# Patient Record
Sex: FEMALE | Race: ASIAN | Employment: UNEMPLOYED | URBAN - METROPOLITAN AREA
[De-identification: names, ages, dates, MRNs, and addresses within clinical notes are randomized per-mention and may not be internally consistent; named-entity substitution may affect disease eponyms.]

---

## 2016-07-26 LAB
ABO + RH BLD: NORMAL
ABO + RH BLD: NORMAL
BLD GP AB SCN SERPL QL: NEGATIVE
HBV SURFACE AG SERPL QL IA: NORMAL
HIV 1+2 AB+HIV1 P24 AG SERPL QL IA: NORMAL
RUBELLA ABY IGG: NORMAL
RUBELLA ANTIBODY IGG QUANTITATIVE: 38 IU/ML
T PALLIDUM IGG SER QL: NEGATIVE

## 2017-01-24 LAB — GROUP B STREP PCR: POSITIVE

## 2017-02-18 ENCOUNTER — ANESTHESIA (OUTPATIENT)
Dept: OBGYN | Facility: CLINIC | Age: 32
End: 2017-02-18
Payer: COMMERCIAL

## 2017-02-18 ENCOUNTER — ANESTHESIA EVENT (OUTPATIENT)
Dept: OBGYN | Facility: CLINIC | Age: 32
End: 2017-02-18
Payer: COMMERCIAL

## 2017-02-18 ENCOUNTER — HOSPITAL ENCOUNTER (INPATIENT)
Facility: CLINIC | Age: 32
LOS: 2 days | Discharge: HOME | End: 2017-02-20
Attending: OBSTETRICS & GYNECOLOGY | Admitting: OBSTETRICS & GYNECOLOGY
Payer: COMMERCIAL

## 2017-02-18 PROBLEM — O26.90 PREGNANCY RELATED CONDITION: Status: ACTIVE | Noted: 2017-02-18

## 2017-02-18 LAB
ABO + RH BLD: NORMAL
ABO + RH BLD: NORMAL
SPECIMEN EXP DATE BLD: NORMAL
T PALLIDUM IGG+IGM SER QL: NEGATIVE

## 2017-02-18 PROCEDURE — 99215 OFFICE O/P EST HI 40 MIN: CPT | Mod: 25

## 2017-02-18 PROCEDURE — 25000125 ZZHC RX 250: Performed by: OBSTETRICS & GYNECOLOGY

## 2017-02-18 PROCEDURE — 25000128 H RX IP 250 OP 636: Performed by: ANESTHESIOLOGY

## 2017-02-18 PROCEDURE — 72200001 ZZH LABOR CARE VAGINAL DELIVERY SINGLE

## 2017-02-18 PROCEDURE — 12000037 ZZH R&B POSTPARTUM INTERMEDIATE

## 2017-02-18 PROCEDURE — 25000132 ZZH RX MED GY IP 250 OP 250 PS 637: Performed by: OBSTETRICS & GYNECOLOGY

## 2017-02-18 PROCEDURE — 59025 FETAL NON-STRESS TEST: CPT

## 2017-02-18 PROCEDURE — 86780 TREPONEMA PALLIDUM: CPT | Performed by: OBSTETRICS & GYNECOLOGY

## 2017-02-18 PROCEDURE — 25000128 H RX IP 250 OP 636: Performed by: OBSTETRICS & GYNECOLOGY

## 2017-02-18 PROCEDURE — 37000011 ZZH ANESTHESIA WARD SERVICE

## 2017-02-18 PROCEDURE — 0HQ9XZZ REPAIR PERINEUM SKIN, EXTERNAL APPROACH: ICD-10-PCS | Performed by: OBSTETRICS & GYNECOLOGY

## 2017-02-18 PROCEDURE — 86900 BLOOD TYPING SEROLOGIC ABO: CPT | Performed by: OBSTETRICS & GYNECOLOGY

## 2017-02-18 PROCEDURE — 3E0S3CZ INTRODUCTION OF REGIONAL ANESTHETIC INTO EPIDURAL SPACE, PERCUTANEOUS APPROACH: ICD-10-PCS | Performed by: ANESTHESIOLOGY

## 2017-02-18 PROCEDURE — 00HU33Z INSERTION OF INFUSION DEVICE INTO SPINAL CANAL, PERCUTANEOUS APPROACH: ICD-10-PCS | Performed by: ANESTHESIOLOGY

## 2017-02-18 PROCEDURE — 25000125 ZZHC RX 250: Performed by: ANESTHESIOLOGY

## 2017-02-18 PROCEDURE — 10907ZC DRAINAGE OF AMNIOTIC FLUID, THERAPEUTIC FROM PRODUCTS OF CONCEPTION, VIA NATURAL OR ARTIFICIAL OPENING: ICD-10-PCS | Performed by: OBSTETRICS & GYNECOLOGY

## 2017-02-18 PROCEDURE — 25800025 ZZH RX 258: Performed by: OBSTETRICS & GYNECOLOGY

## 2017-02-18 PROCEDURE — 86901 BLOOD TYPING SEROLOGIC RH(D): CPT | Performed by: OBSTETRICS & GYNECOLOGY

## 2017-02-18 PROCEDURE — 36415 COLL VENOUS BLD VENIPUNCTURE: CPT | Performed by: OBSTETRICS & GYNECOLOGY

## 2017-02-18 PROCEDURE — 25000125 ZZHC RX 250

## 2017-02-18 RX ORDER — OXYTOCIN/0.9 % SODIUM CHLORIDE 30/500 ML
340 PLASTIC BAG, INJECTION (ML) INTRAVENOUS CONTINUOUS PRN
Status: DISCONTINUED | OUTPATIENT
Start: 2017-02-18 | End: 2017-02-20 | Stop reason: HOSPADM

## 2017-02-18 RX ORDER — BISACODYL 10 MG
10 SUPPOSITORY, RECTAL RECTAL DAILY PRN
Status: DISCONTINUED | OUTPATIENT
Start: 2017-02-20 | End: 2017-02-20 | Stop reason: HOSPADM

## 2017-02-18 RX ORDER — OXYCODONE HYDROCHLORIDE 5 MG/1
5-10 TABLET ORAL
Status: DISCONTINUED | OUTPATIENT
Start: 2017-02-18 | End: 2017-02-20 | Stop reason: HOSPADM

## 2017-02-18 RX ORDER — OXYCODONE AND ACETAMINOPHEN 5; 325 MG/1; MG/1
1 TABLET ORAL
Status: DISCONTINUED | OUTPATIENT
Start: 2017-02-18 | End: 2017-02-18

## 2017-02-18 RX ORDER — LIDOCAINE 40 MG/G
CREAM TOPICAL
Status: DISCONTINUED | OUTPATIENT
Start: 2017-02-18 | End: 2017-02-18

## 2017-02-18 RX ORDER — ROPIVACAINE HYDROCHLORIDE 2 MG/ML
10 INJECTION, SOLUTION EPIDURAL; INFILTRATION; PERINEURAL ONCE
Status: COMPLETED | OUTPATIENT
Start: 2017-02-18 | End: 2017-02-18

## 2017-02-18 RX ORDER — PRENATAL VIT/IRON FUM/FOLIC AC 27MG-0.8MG
1 TABLET ORAL DAILY
Status: DISCONTINUED | OUTPATIENT
Start: 2017-02-18 | End: 2017-02-20 | Stop reason: HOSPADM

## 2017-02-18 RX ORDER — FENTANYL CITRATE 50 UG/ML
50-100 INJECTION, SOLUTION INTRAMUSCULAR; INTRAVENOUS
Status: DISCONTINUED | OUTPATIENT
Start: 2017-02-18 | End: 2017-02-18

## 2017-02-18 RX ORDER — OXYTOCIN/0.9 % SODIUM CHLORIDE 30/500 ML
100-340 PLASTIC BAG, INJECTION (ML) INTRAVENOUS CONTINUOUS PRN
Status: COMPLETED | OUTPATIENT
Start: 2017-02-18 | End: 2017-02-18

## 2017-02-18 RX ORDER — OXYTOCIN/0.9 % SODIUM CHLORIDE 30/500 ML
1-24 PLASTIC BAG, INJECTION (ML) INTRAVENOUS CONTINUOUS
Status: DISCONTINUED | OUTPATIENT
Start: 2017-02-18 | End: 2017-02-18

## 2017-02-18 RX ORDER — ONDANSETRON 2 MG/ML
4 INJECTION INTRAMUSCULAR; INTRAVENOUS EVERY 6 HOURS PRN
Status: DISCONTINUED | OUTPATIENT
Start: 2017-02-18 | End: 2017-02-18

## 2017-02-18 RX ORDER — MISOPROSTOL 200 UG/1
400 TABLET ORAL
Status: DISCONTINUED | OUTPATIENT
Start: 2017-02-18 | End: 2017-02-20 | Stop reason: HOSPADM

## 2017-02-18 RX ORDER — IBUPROFEN 400 MG/1
400-800 TABLET, FILM COATED ORAL EVERY 6 HOURS PRN
Status: DISCONTINUED | OUTPATIENT
Start: 2017-02-18 | End: 2017-02-20 | Stop reason: HOSPADM

## 2017-02-18 RX ORDER — SODIUM CHLORIDE, SODIUM LACTATE, POTASSIUM CHLORIDE, CALCIUM CHLORIDE 600; 310; 30; 20 MG/100ML; MG/100ML; MG/100ML; MG/100ML
INJECTION, SOLUTION INTRAVENOUS CONTINUOUS
Status: DISCONTINUED | OUTPATIENT
Start: 2017-02-18 | End: 2017-02-18

## 2017-02-18 RX ORDER — AMOXICILLIN 250 MG
1-2 CAPSULE ORAL 2 TIMES DAILY
Status: DISCONTINUED | OUTPATIENT
Start: 2017-02-18 | End: 2017-02-20 | Stop reason: HOSPADM

## 2017-02-18 RX ORDER — FENTANYL CITRATE 50 UG/ML
100 INJECTION, SOLUTION INTRAMUSCULAR; INTRAVENOUS ONCE
Status: COMPLETED | OUTPATIENT
Start: 2017-02-18 | End: 2017-02-18

## 2017-02-18 RX ORDER — ROPIVACAINE HYDROCHLORIDE 2 MG/ML
INJECTION, SOLUTION EPIDURAL; INFILTRATION; PERINEURAL
Status: DISCONTINUED
Start: 2017-02-18 | End: 2017-02-18 | Stop reason: HOSPADM

## 2017-02-18 RX ORDER — LANOLIN 100 %
OINTMENT (GRAM) TOPICAL
Status: DISCONTINUED | OUTPATIENT
Start: 2017-02-18 | End: 2017-02-20 | Stop reason: HOSPADM

## 2017-02-18 RX ORDER — FERROUS SULFATE 325(65) MG
TABLET ORAL
COMMUNITY

## 2017-02-18 RX ORDER — OXYTOCIN/0.9 % SODIUM CHLORIDE 30/500 ML
PLASTIC BAG, INJECTION (ML) INTRAVENOUS
Status: COMPLETED
Start: 2017-02-18 | End: 2017-02-18

## 2017-02-18 RX ORDER — CARBOPROST TROMETHAMINE 250 UG/ML
250 INJECTION, SOLUTION INTRAMUSCULAR
Status: DISCONTINUED | OUTPATIENT
Start: 2017-02-18 | End: 2017-02-18

## 2017-02-18 RX ORDER — HYDROMORPHONE HYDROCHLORIDE 1 MG/ML
.3-.5 INJECTION, SOLUTION INTRAMUSCULAR; INTRAVENOUS; SUBCUTANEOUS EVERY 30 MIN PRN
Status: DISCONTINUED | OUTPATIENT
Start: 2017-02-18 | End: 2017-02-20 | Stop reason: HOSPADM

## 2017-02-18 RX ORDER — PENICILLIN G POTASSIUM 5000000 [IU]/1
5 INJECTION, POWDER, FOR SOLUTION INTRAMUSCULAR; INTRAVENOUS ONCE
Status: COMPLETED | OUTPATIENT
Start: 2017-02-18 | End: 2017-02-18

## 2017-02-18 RX ORDER — ACETAMINOPHEN 325 MG/1
650 TABLET ORAL EVERY 4 HOURS PRN
Status: DISCONTINUED | OUTPATIENT
Start: 2017-02-18 | End: 2017-02-18

## 2017-02-18 RX ORDER — NALOXONE HYDROCHLORIDE 0.4 MG/ML
.1-.4 INJECTION, SOLUTION INTRAMUSCULAR; INTRAVENOUS; SUBCUTANEOUS
Status: DISCONTINUED | OUTPATIENT
Start: 2017-02-18 | End: 2017-02-18

## 2017-02-18 RX ORDER — EPHEDRINE SULFATE 50 MG/ML
5 INJECTION, SOLUTION INTRAMUSCULAR; INTRAVENOUS; SUBCUTANEOUS
Status: DISCONTINUED | OUTPATIENT
Start: 2017-02-18 | End: 2017-02-18

## 2017-02-18 RX ORDER — IBUPROFEN 800 MG/1
800 TABLET, FILM COATED ORAL
Status: DISCONTINUED | OUTPATIENT
Start: 2017-02-18 | End: 2017-02-18

## 2017-02-18 RX ORDER — ACETAMINOPHEN 325 MG/1
650 TABLET ORAL EVERY 4 HOURS PRN
Status: DISCONTINUED | OUTPATIENT
Start: 2017-02-18 | End: 2017-02-20 | Stop reason: HOSPADM

## 2017-02-18 RX ORDER — OXYTOCIN 10 [USP'U]/ML
10 INJECTION, SOLUTION INTRAMUSCULAR; INTRAVENOUS
Status: DISCONTINUED | OUTPATIENT
Start: 2017-02-18 | End: 2017-02-18

## 2017-02-18 RX ORDER — NALOXONE HYDROCHLORIDE 0.4 MG/ML
.1-.4 INJECTION, SOLUTION INTRAMUSCULAR; INTRAVENOUS; SUBCUTANEOUS
Status: DISCONTINUED | OUTPATIENT
Start: 2017-02-18 | End: 2017-02-20 | Stop reason: HOSPADM

## 2017-02-18 RX ORDER — HYDROCORTISONE 2.5 %
CREAM (GRAM) TOPICAL 3 TIMES DAILY PRN
Status: DISCONTINUED | OUTPATIENT
Start: 2017-02-18 | End: 2017-02-20 | Stop reason: HOSPADM

## 2017-02-18 RX ORDER — METHYLERGONOVINE MALEATE 0.2 MG/ML
200 INJECTION INTRAVENOUS
Status: DISCONTINUED | OUTPATIENT
Start: 2017-02-18 | End: 2017-02-18

## 2017-02-18 RX ORDER — OXYTOCIN/0.9 % SODIUM CHLORIDE 30/500 ML
100 PLASTIC BAG, INJECTION (ML) INTRAVENOUS CONTINUOUS
Status: DISCONTINUED | OUTPATIENT
Start: 2017-02-18 | End: 2017-02-20 | Stop reason: HOSPADM

## 2017-02-18 RX ORDER — NALBUPHINE HYDROCHLORIDE 10 MG/ML
2.5-5 INJECTION, SOLUTION INTRAMUSCULAR; INTRAVENOUS; SUBCUTANEOUS EVERY 6 HOURS PRN
Status: DISCONTINUED | OUTPATIENT
Start: 2017-02-18 | End: 2017-02-18

## 2017-02-18 RX ORDER — OXYTOCIN/0.9 % SODIUM CHLORIDE 30/500 ML
PLASTIC BAG, INJECTION (ML) INTRAVENOUS
Status: DISCONTINUED
Start: 2017-02-18 | End: 2017-02-18 | Stop reason: HOSPADM

## 2017-02-18 RX ORDER — PENICILLIN G POTASSIUM 5000000 [IU]/1
INJECTION, POWDER, FOR SOLUTION INTRAMUSCULAR; INTRAVENOUS
Status: DISCONTINUED
Start: 2017-02-18 | End: 2017-02-18 | Stop reason: HOSPADM

## 2017-02-18 RX ORDER — OXYTOCIN 10 [USP'U]/ML
10 INJECTION, SOLUTION INTRAMUSCULAR; INTRAVENOUS
Status: DISCONTINUED | OUTPATIENT
Start: 2017-02-18 | End: 2017-02-20 | Stop reason: HOSPADM

## 2017-02-18 RX ORDER — EPHEDRINE SULFATE 50 MG/ML
INJECTION, SOLUTION INTRAMUSCULAR; INTRAVENOUS; SUBCUTANEOUS
Status: DISCONTINUED
Start: 2017-02-18 | End: 2017-02-18 | Stop reason: WASHOUT

## 2017-02-18 RX ADMIN — Medication 12 ML/HR: at 14:30

## 2017-02-18 RX ADMIN — Medication 2 MILLI-UNITS/MIN: at 13:07

## 2017-02-18 RX ADMIN — PENICILLIN G POTASSIUM 5 MILLION UNITS: 5000000 POWDER, FOR SOLUTION INTRAMUSCULAR; INTRAPLEURAL; INTRATHECAL; INTRAVENOUS at 06:02

## 2017-02-18 RX ADMIN — IBUPROFEN 800 MG: 400 TABLET ORAL at 23:49

## 2017-02-18 RX ADMIN — Medication 340 ML/HR: at 16:14

## 2017-02-18 RX ADMIN — SENNOSIDES AND DOCUSATE SODIUM 1 TABLET: 8.6; 5 TABLET ORAL at 21:56

## 2017-02-18 RX ADMIN — FENTANYL CITRATE 100 MCG: 50 INJECTION, SOLUTION INTRAMUSCULAR; INTRAVENOUS at 14:34

## 2017-02-18 RX ADMIN — ACETAMINOPHEN 650 MG: 325 TABLET, FILM COATED ORAL at 17:37

## 2017-02-18 RX ADMIN — IBUPROFEN 800 MG: 400 TABLET ORAL at 17:37

## 2017-02-18 RX ADMIN — SODIUM CHLORIDE, POTASSIUM CHLORIDE, SODIUM LACTATE AND CALCIUM CHLORIDE: 600; 310; 30; 20 INJECTION, SOLUTION INTRAVENOUS at 13:07

## 2017-02-18 RX ADMIN — ROPIVACAINE HYDROCHLORIDE 10 ML: 2 INJECTION, SOLUTION EPIDURAL; INFILTRATION at 14:34

## 2017-02-18 RX ADMIN — ACETAMINOPHEN 650 MG: 325 TABLET, FILM COATED ORAL at 23:49

## 2017-02-18 RX ADMIN — SODIUM CHLORIDE, POTASSIUM CHLORIDE, SODIUM LACTATE AND CALCIUM CHLORIDE 1000 ML: 600; 310; 30; 20 INJECTION, SOLUTION INTRAVENOUS at 14:07

## 2017-02-18 RX ADMIN — SODIUM CHLORIDE 2.5 MILLION UNITS: 9 INJECTION, SOLUTION INTRAVENOUS at 14:30

## 2017-02-18 RX ADMIN — SODIUM CHLORIDE 2.5 MILLION UNITS: 9 INJECTION, SOLUTION INTRAVENOUS at 10:02

## 2017-02-18 RX ADMIN — SODIUM CHLORIDE, POTASSIUM CHLORIDE, SODIUM LACTATE AND CALCIUM CHLORIDE 125 ML/HR: 600; 310; 30; 20 INJECTION, SOLUTION INTRAVENOUS at 06:02

## 2017-02-18 ASSESSMENT — ENCOUNTER SYMPTOMS
RESPIRATORY NEGATIVE: 1
CONSTITUTIONAL NEGATIVE: 1
MUSCULOSKELETAL NEGATIVE: 1
CARDIOVASCULAR NEGATIVE: 1
EYES NEGATIVE: 1
NEUROLOGICAL NEGATIVE: 1
PSYCHIATRIC NEGATIVE: 1
GASTROINTESTINAL NEGATIVE: 1

## 2017-02-18 NOTE — IP AVS SNAPSHOT
"                  MRN:[id number]                      After Visit Summary   2/18/2017    Sultana Oliveros    MRN: [id number]           Thank you!     Thank you for choosing [facility] for your care. Our goal is always to provide you with excellent care. Hearing back from our patients is one way we can continue to improve our services. Please take a few minutes to complete the written survey that you may receive in the mail after you visit with us. Thank you!        Patient Information     Date Of Birth          1985        About your hospital stay     You were admitted on:  February 18, 2017 You last received care in the:  [address]    You were discharged on:  February 20, 2017        Reason for your hospital stay        was admitted and deliverred a healthy baby.                  Who to Call     For medical emergencies, please call 911.  For non-urgent questions about your medical care, please call your primary care provider or clinic, [phone]          Attending Provider     Provider Specialty    Wesly Higuera MD OB/Gyn       Primary Care Provider Office Phone # Fax #    Wesly Higuera [phone]       [facility] [address] [place] [address]        Follow-up Appointments     Follow-up and recommended labs and tests        Rest, walk around, rest  Call if you have a fever to 101F, pass a clot the size of \"grapefruit\" or have any questions, [phone].  Schedule your postpartum appointment for 6 wks.  Have fun!  DBlock                  Further instructions from your care team       Postpartum Vaginal Delivery Instructions    Activity       Ask family and friends for help when you need it.    Do not place anything in your vagina for 6 weeks.    You are not restricted on other activities, but take it easy for a few weeks to allow your body to recover from delivery.  You are able to do any activities you feel up to that point.    No " driving until you have stopped taking your pain medications (usually two weeks after delivery).     Call your health care provider if you have any of these symptoms:       Increased pain, swelling, redness, or fluid around your stiches from an episiotomy or perineal tear.    A fever above 100.4 F (38 C) with or without chills when placing a thermometer under your tongue.    You soak a sanitary pad with blood within 1 hour, or you see blood clots larger than a golf ball.    Bleeding that lasts more than 6 weeks.    Vaginal discharge that smells bad.    Severe pain, cramping or tenderness in your lower belly area.    A need to urinate more frequently (use the toilet more often), more urgently (use the toilet very quickly), or it burns when you urinate.    Nausea and vomiting.    Redness, swelling or pain around a vein in your leg.    Problems breastfeeding or a red or painful area on your breast.    Chest pain and cough or are gasping for air.    Problems coping with sadness, anxiety, or depression.  If you have any concerns about hurting yourself or the baby, call your provider immediately.     You have questions or concerns after you return home.     Keep your hands clean:  Always wash your hands before touching your perineal area and stitches.  This helps reduce your risk of infection.  If your hands aren't dirty, you may use an alcohol hand-rub to clean your hands. Keep your nails clean and short.        Pending Results     No orders found from 2/16/2017 to 2/19/2017.            Statement of Approval     Ordered          02/20/17 0710  I have reviewed and agree with all the recommendations and orders detailed in this document.  EFFECTIVE NOW     Approved and electronically signed by:  Victorina Padilla MD             Admission Information     Date & Time Provider Department Dept. Phone    2/18/2017 Wesly Higuera MD [address] [phone]      Your Vitals Were     Blood  "Pressure Pulse Temperature Respirations Height Weight    147/81 93 98.1  F (36.7  C) (Oral) 16 1.549 m (5' 1\") 57.6 kg (127 lb)    Last Period Pulse Oximetry BMI (Body Mass Index)             2016 98% 24 kg/m2         "[employer] Information     "[employer] lets you send messages to your doctor, view your test results, renew your prescriptions, schedule appointments and more. To sign up, go to [email or web address] . Click on \"Log in\" on the left side of the screen, which will take you to the Welcome page. Then click on \"Sign up Now\" on the right side of the page.     You will be asked to enter the access code listed below, as well as some personal information. Please follow the directions to create your username and password.     Your access code is: [email or web address]  Expires: 2017  9:04 AM     Your access code will  in 90 days. If you need help or a new code, please call your [facility] clinic or [phone].        Care EveryWhere ID     This is your Care EveryWhere ID. This could be used by other organizations to access your [facility] medical records  [id number]           Review of your medicines      CONTINUE these medicines which may have CHANGED, or have new prescriptions. If we are uncertain of the size of tablets/capsules you have at home, strength may be listed as something that might have changed.        Dose / Directions    * ibuprofen 400-800 mg tablet   Commonly known as:  ADVIL,MOTRIN   This may have changed:  Another medication with the same name was added. Make sure you understand how and when to take each.   Used for:  Vaginal delivery        Dose:  400-800 mg   Take 1-2 tablets (400-800 mg) by mouth every 6 hours as needed for other (cramping)   Quantity:  90 tablet   Refills:  5       * ibuprofen 400 MG tablet   Commonly known as:  ADVIL/MOTRIN   This may have changed:  You were already taking a medication with the same name, and this prescription was added. Make sure you understand how " and when to take each.        Dose:  800 mg   Take 2 tablets (800 mg) by mouth every 6 hours as needed for other (cramping)   Quantity:  120 tablet   Refills:  3       * Notice:  This list has 2 medication(s) that are the same as other medications prescribed for you. Read the directions carefully, and ask your doctor or other care provider to review them with you.      CONTINUE these medicines which have NOT CHANGED        Dose / Directions    acetaminophen 325 MG tablet   Commonly known as:  TYLENOL   Used for:  Vaginal delivery        Dose:  650 mg   Take 2 tablets (650 mg) by mouth every 4 hours as needed for mild pain or fever (greater than or equal to 38? C /100.4? F (oral) or 38.5? C/ 101.4? F (core).)   Quantity:  100 tablet   Refills:  5       ferrous sulfate 325 (65 FE) MG tablet   Commonly known as:  IRON        Take by mouth daily (with breakfast)   Refills:  0       prenatal multivitamin  plus iron 27-0.8 MG Tabs per tablet        Dose:  1 tablet   Take 1 tablet by mouth daily   Refills:  0            Where to get your medicines      Some of these will need a paper prescription and others can be bought over the counter. Ask your nurse if you have questions.     Bring a paper prescription for each of these medications     ibuprofen 400 MG tablet                Protect others around you: Learn how to safely use, store and throw away your medicines at www.disposemymeds.org.             Medication List: This is a list of all your medications and when to take them. Check marks below indicate your daily home schedule. Keep this list as a reference.      Medications           Morning Afternoon Evening Bedtime As Needed    acetaminophen 325 MG tablet   Commonly known as:  TYLENOL   Take 2 tablets (650 mg) by mouth every 4 hours as needed for mild pain or fever (greater than or equal to 38? C /100.4? F (oral) or 38.5? C/ 101.4? F (core).)   Last time this was given:  650 mg on 2/20/2017  6:36 AM                                 ferrous sulfate 325 (65 FE) MG tablet   Commonly known as:  IRON   Take by mouth daily (with breakfast)                                * ibuprofen 400-800 mg tablet   Commonly known as:  ADVIL,MOTRIN   Take 1-2 tablets (400-800 mg) by mouth every 6 hours as needed for other (cramping)                                * ibuprofen 400 MG tablet   Commonly known as:  ADVIL/MOTRIN   Take 2 tablets (800 mg) by mouth every 6 hours as needed for other (cramping)   Last time this was given:  800 mg on 2/20/2017  6:36 AM                                prenatal multivitamin  plus iron 27-0.8 MG Tabs per tablet   Take 1 tablet by mouth daily   Last time this was given:  1 tablet on 2/20/2017  8:21 AM                                * Notice:  This list has 2 medication(s) that are the same as other medications prescribed for you. Read the directions carefully, and ask your doctor or other care provider to review them with you.

## 2017-02-18 NOTE — IP AVS SNAPSHOT
[address]    [place] [address]    Phone:  [phone]                                       After Visit Summary   2/18/2017    Sultana Oliveros    MRN: [id number]           After Visit Summary Signature Page     I have received my discharge instructions, and my questions have been answered. I have discussed any challenges I see with this plan with the nurse or doctor.    ..........................................................................................................................................  Patient/Patient Representative Signature      ..........................................................................................................................................  Patient Representative Print Name and Relationship to Patient    ..................................................               ................................................  Date                                            Time    ..........................................................................................................................................  Reviewed by Signature/Title    ...................................................              ..............................................  Date                                                            Time

## 2017-02-18 NOTE — PLAN OF CARE
0600- IV started on right lower forearm. Teary after. Labs also drawn. Penicillin infusing. States not ready for Epidural just yet.

## 2017-02-18 NOTE — PLAN OF CARE
Problem: Labor (Cervical Ripen, Induct, Augment) (Adult,Obstetrics,Pediatric)  Goal: Signs and Symptoms of Listed Potential Problems Will be Absent or Manageable (Labor)  Signs and symptoms of listed potential problems will be absent or manageable by discharge/transition of care (reference Labor (Cervical Ripen, Induct, Augment) (Adult,Obstetrics,Pediatric) CPG).   Outcome: Completed Date Met:  17   at 1609  Male 9 and 9 apgars

## 2017-02-18 NOTE — PLAN OF CARE
Problem: Labor (Cervical Ripen, Induct, Augment) (Adult,Obstetrics,Pediatric)  Goal: Signs and Symptoms of Listed Potential Problems Will be Absent or Manageable (Labor)  Signs and symptoms of listed potential problems will be absent or manageable by discharge/transition of care (reference Labor (Cervical Ripen, Induct, Augment) (Adult,Obstetrics,Pediatric) CPG).   Outcome: No Change  Maryam every 7-12 min.  Able to talk easily through them.  Awaiting Dr Higuera for further plan.

## 2017-02-18 NOTE — PLAN OF CARE
Multip admitted to MAC, ambulatory per services of Dr. Higuera for evaluation of labor.  Pt states she began gemma around 1300 yesterday.  States UC's have progressively gotten closer and stronger, now every 5-10 minutes.  Denies LOF or bloody show.  Reports good fetal movement.  Discussed plan of care including EFM with NST, routine VS and SVE.  Pt agreeable.  EFM applied and admission assessment completed.

## 2017-02-18 NOTE — ANESTHESIA PROCEDURE NOTES
Peripheral nerve/Neuraxial procedure note : epidural catheter  Pre-Procedure  Performed by JANICE FONSECA  Location: OB      Pre-Anesthestic Checklist: patient identified, IV checked, risks and benefits discussed, informed consent, monitors and equipment checked, pre-op evaluation and at physician/surgeon's request    Timeout  Correct Patient: Yes   Correct Procedure: Yes   Correct Site: Yes   Correct Laterality: N/A   Correct Position: Yes   Site Marked: N/A   .   Procedure Documentation    .    Procedure:    Epidural catheter.  Insertion Site:L3-4  (midline approach) Injection technique: LORT saline   Local skin infiltrated with 3 mL of 1% lidocaine.  HOLLIS at 3 cm     Patient Prep;mask, sterile gloves, povidone-iodine 7.5% surgical scrub, patient draped.  .  Needle: Touhy needle (17 G. 3.5 in). # of attempts: 1. # of redirects:: 0..  Spinal Needle: . . . Catheter: 19 G .  .  .  Catheter threaded easily, 6 at the skin and 3 cm in the epidural space.     Assessment/Narrative  Paresthesias: No.  .  .  Aspiration negative for heme or CSF  . Test dose of 3 mL lidocaine 1.5% w/ 1:200,000 epinephrine at. Test dose negative for signs of intravascular, subdural or intrathecal injection. Comments:  Pt tolerated well.   Immediately to supine with KRISTIE.   FHTs stable post-procedure.   No complications.

## 2017-02-18 NOTE — L&D DELIVERY NOTE
Sultana Oliveros   Admission date: 2017  Delivery date:  17  Place of delivery: [facility]    The patient is a 31 year old  at 40w2d  wks gestation admitted to labor and delivery for contractions.  Initially they were moderate and q 10 minutes but she had shown cervical change from the office.  Her  course was uncomplicated.  The estimate fetal weight is 7.5 lbs.      Pitocin was used to augment contractions .   Membranes artifically ruptured and the fluid was clear. For pain management she had epidural with fair relief.      She progressed to complete dilation.  She had excellent maternal expulsive efforts. At 1609 she delivered a viable male infant weighing 7 pounds 15 ounces with apgars of 9 at 1 min and 9 at 5 minutes.  The infant was bulb suctioned on the perineum and again upon delivery.  The infant was handed off to his parents and the nursery team in attendance.    The placenta delivered spontaneously and intact.  She received pitocin upon placental delivery.  The estimated blood loss was 200 cc.    The cervix and vagina were inspected.  There was a small first degree perineal laceration which was repaired with 3-0 vicryl assuring hemostasis and close approximation.     During labor the fetal heart tones were category II.    Mother and baby did well and went to normal postpartum and  nursery.    GBS was positive and she received adequate abx prophylaxis.       Wesly Higuera

## 2017-02-18 NOTE — H&P
"Sultana Oliveros is an 31 year old  at 40w2d c/o contractions starting last night q  10 minutes.  Cervix on admission was reported 3 cm,soft and anterior which is a change from her cervical exam in the office.   She continues to have mild/moderate contractions q 10 minutes, +FM  No complications in pregnancy.  Her care was interrupted by a trip to [place]  GBS positive and abx started.     Obstetric History       T1      TAB0   SAB0   E0   M0   L2       # Outcome Date GA Lbr Rey/2nd Weight Sex Delivery Anes PTL Lv   3 Current            2 Term 01/23/15 39w2d 06:28 / 00:26 2.97 kg (6 lb 8.8 oz) F Vag-Spont EPI  Y      Apgar1:  8                Apgar5: 9   1  13 36w2d 06:00 / 01:03 1.92 kg (4 lb 3.7 oz) F    Y      Name: ,CLINT NAOMI      Apgar1:  8                Apgar5: 9        History reviewed. No pertinent past surgical history.      Past Medical History   Diagnosis Date     NO ACTIVE PROBLEMS        Allergies: No Known Allergies    Active Problems:    Pregnancy related condition    Indication for care in labor or delivery    Blood pressure 139/77, temperature 97.3  F (36.3  C), temperature source Temporal, resp. rate 16, height 1.549 m (5' 1\"), weight 57.6 kg (127 lb), last menstrual period 2016, unknown if currently breastfeeding.    Review of Systems   Constitutional: Negative.    HENT: Negative.    Eyes: Negative.    Respiratory: Negative.    Cardiovascular: Negative.    Gastrointestinal: Negative.    Genitourinary: Negative.    Musculoskeletal: Negative.    Skin: Negative.    Neurological: Negative.    Endo/Heme/Allergies: Negative.    Psychiatric/Behavioral: Negative.        Physical Exam   Constitutional: She appears well-developed and well-nourished.   HENT:   Head: Normocephalic and atraumatic.   Eyes: Pupils are equal, round, and reactive to light.   Neck: Normal range of motion.   Cardiovascular: Normal rate and regular rhythm.    Pulmonary/Chest: Effort " normal and breath sounds normal.   Abdominal: Soft. Bowel sounds are normal.   Genitourinary: Vagina normal.   Genitourinary Comments: 4 cm/ 80/-2  FHT: 125 mod arely, category I  TOCO: occasional     Musculoskeletal: Normal range of motion.   Neurological: She is alert.   Skin: Skin is warm and dry.   Psychiatric: She has a normal mood and affect. Her behavior is normal. Judgment and thought content normal.       Assessment:   at 40w2d   Contractions  Favorable cervix    Plan:  Admit to labor and delivery  AROM and Pit  abx for gbs  Continue care for anticipated vaginal delivery    Wesly Higuera  2017

## 2017-02-19 PROCEDURE — 12000037 ZZH R&B POSTPARTUM INTERMEDIATE

## 2017-02-19 PROCEDURE — 25000132 ZZH RX MED GY IP 250 OP 250 PS 637: Performed by: OBSTETRICS & GYNECOLOGY

## 2017-02-19 RX ADMIN — IBUPROFEN 800 MG: 400 TABLET ORAL at 23:59

## 2017-02-19 RX ADMIN — SENNOSIDES AND DOCUSATE SODIUM 2 TABLET: 8.6; 5 TABLET ORAL at 08:49

## 2017-02-19 RX ADMIN — ACETAMINOPHEN 650 MG: 325 TABLET, FILM COATED ORAL at 11:56

## 2017-02-19 RX ADMIN — ACETAMINOPHEN 650 MG: 325 TABLET, FILM COATED ORAL at 05:32

## 2017-02-19 RX ADMIN — IBUPROFEN 800 MG: 400 TABLET ORAL at 05:32

## 2017-02-19 RX ADMIN — PRENATAL VIT W/ FE FUMARATE-FA TAB 27-0.8 MG 1 TABLET: 27-0.8 TAB at 08:50

## 2017-02-19 RX ADMIN — ACETAMINOPHEN 650 MG: 325 TABLET, FILM COATED ORAL at 18:12

## 2017-02-19 RX ADMIN — IBUPROFEN 800 MG: 400 TABLET ORAL at 11:56

## 2017-02-19 RX ADMIN — SENNOSIDES AND DOCUSATE SODIUM 1 TABLET: 8.6; 5 TABLET ORAL at 20:22

## 2017-02-19 RX ADMIN — IBUPROFEN 800 MG: 400 TABLET ORAL at 18:13

## 2017-02-19 NOTE — PROGRESS NOTES
"Sultana Oliveros  2017    S: pt doing well.  Tailbone sore, otherwise minimal pain.  Ambulating without difficulty.  Tolerating po intake.  Decreased lochia.  Breast feeding.    O: /86  Pulse 86  Temp 97.9  F (36.6  C) (Oral)  Resp 16  Ht 1.549 m (5' 1\")  Wt 57.6 kg (127 lb)  LMP 2016  SpO2 98%  Breastfeeding? Unknown  BMI 24 kg/m2    Abdomen: soft, non tender, fundus firm below the umbilicus  Ext: non tender, no edema or erythema    A/P: s/p  PPD #1  gbs positive  Doing well  Continue routine post partum care  Discharge planning for tomorrow    Wesly Higuera    "

## 2017-02-19 NOTE — PLAN OF CARE
Problem: Goal Outcome Summary  Goal: Goal Outcome Summary  Patient meeting expected goals for this shift.  Using ibuprofen & tylenol for pain/comfort. Working on breastfeeding  and  cares.   present at bedside and supportive.  Positive bonding behaviors observed.  Continue to monitor and notify MD as needed.

## 2017-02-19 NOTE — PLAN OF CARE
Initial Lactation visit.   Hand out given.  Recommend unlimited, frequent breast feedings:  At least 8 - 12 times every 24 hours.  Avoid pacifiers and supplementation with formula unless medically indicated    Explained benefits of holding baby skin on skin to help promote better breastfeeding outcomes. Observed good latch and rhythmic suckle. Will continue to follow as needed. N Day RN IBCLC

## 2017-02-19 NOTE — PLAN OF CARE
Data: Sultana Oliveros transferred to [facility] via wheelchair at 1910. Baby transferred via parent's arms.  Action: Receiving unit notified of transfer: Yes. Patient and family notified of room change. Report given to Maru Newby RN at 1905. Belongings sent to receiving unit. Accompanied by Registered Nurse. Oriented patient to surroundings. Call light within reach. ID bands double-checked with receiving RN.  Response: Patient tolerated transfer and is stable.

## 2017-02-19 NOTE — PLAN OF CARE
Problem: Goal Outcome Summary  Goal: Goal Outcome Summary  Outcome: No Change  Data: Vital signs within normal limits. Postpartum checks within normal limits - see flow record. Patient eating and drinking normally. Patient able to empty bladder independently and is up ambulating. No apparent signs of infection. perineum healing well. Patient performing self cares and is able to care for infant.  Action: Patient medicated during the shift for pain. See MAR. Patient reassessed within 1 hour after each medication and pain was improved - patient stated she was comfortable. See flow record.  Response: Positive attachment behaviors observed with infant. Support persons is present.   Plan: Anticipate discharge on 2/20.

## 2017-02-19 NOTE — PROGRESS NOTES
Pt. admitted from L&D  via wheelchair and transferred to bed with assist of 1. Pt. arrived with baby and was accompanied by her  and arrived with personal belongings. Report was taken from Bertha in L&D. VSS. Fundus is firm and midline.  Vaginal bleeding is small.  Oxytocin is running.  Pt. oriented to the room and call light system.

## 2017-02-19 NOTE — PLAN OF CARE
Problem: Goal Outcome Summary  Goal: Goal Outcome Summary  Outcome: No Change  Breastfeeding with minimal assistance from staff. Pain well controlled with tylenol and ibuprofen. Able to ambulate independently.

## 2017-02-20 VITALS
WEIGHT: 127 LBS | BODY MASS INDEX: 23.98 KG/M2 | TEMPERATURE: 98.1 F | OXYGEN SATURATION: 98 % | HEIGHT: 61 IN | SYSTOLIC BLOOD PRESSURE: 147 MMHG | RESPIRATION RATE: 16 BRPM | DIASTOLIC BLOOD PRESSURE: 81 MMHG | HEART RATE: 93 BPM

## 2017-02-20 PROCEDURE — 25000132 ZZH RX MED GY IP 250 OP 250 PS 637: Performed by: OBSTETRICS & GYNECOLOGY

## 2017-02-20 RX ORDER — IBUPROFEN 400 MG/1
800 TABLET, FILM COATED ORAL EVERY 6 HOURS PRN
Qty: 120 TABLET | Refills: 3 | Status: SHIPPED | OUTPATIENT
Start: 2017-02-20

## 2017-02-20 RX ADMIN — IBUPROFEN 800 MG: 400 TABLET ORAL at 06:36

## 2017-02-20 RX ADMIN — PRENATAL VIT W/ FE FUMARATE-FA TAB 27-0.8 MG 1 TABLET: 27-0.8 TAB at 08:21

## 2017-02-20 RX ADMIN — ACETAMINOPHEN 650 MG: 325 TABLET, FILM COATED ORAL at 00:00

## 2017-02-20 RX ADMIN — ACETAMINOPHEN 650 MG: 325 TABLET, FILM COATED ORAL at 06:36

## 2017-02-20 RX ADMIN — SENNOSIDES AND DOCUSATE SODIUM 1 TABLET: 8.6; 5 TABLET ORAL at 08:21

## 2017-02-20 NOTE — PLAN OF CARE
Problem: Goal Outcome Summary  Goal: Goal Outcome Summary  Outcome: No Change  Data: Vital signs within normal limits. Postpartum checks within normal limits - see flow record. Patient eating and drinking normally. Patient able to empty bladder independently and is up ambulating. No apparent signs of infection. perineum healing well. Patient performing self cares and is able to care for infant.  Action: Patient medicated during the shift for pain. See MAR. Patient reassessed within 1 hour after each medication and pain was improved - patient stated she was comfortable. See flow record.  Response: Positive attachment behaviors observed with infant. Support persons is not present.   Plan: Anticipate discharge on 2/20.

## 2017-02-20 NOTE — PROGRESS NOTES
"Sultana Askewayad  1985 2/18/2017    Date of Note: 2-20-17  Location of Service:  [facility]      PPD #2    S: adeq pain control, min bldg, rested  O: VSS, Afebrile  /75  Pulse 80  Temp 98.1  F (36.7  C) (Oral)  Resp 16  Ht 1.549 m (5' 1\")  Wt 57.6 kg (127 lb)  LMP 05/19/2016  SpO2 98%  Breastfeeding? Unknown  BMI 24 kg/m2  -3U firm  Ext neg    A: Stable  P: advance cares to home  F/up in 6 wks    Victorina Padilla MD  "

## 2017-02-20 NOTE — PLAN OF CARE
Problem: Goal Outcome Summary  Goal: Goal Outcome Summary  Patient meeting expected goals for this shift.  BP slightly elevated this evening.  Using ibuprofen & tylenol for pain/comfort.  Requested  in and out of nursery for rest.  Working on breastfeeding  and  cares.  Positive bonding behaviors observed.  Continue to monitor and notify MD as needed.

## 2017-02-20 NOTE — PLAN OF CARE
Problem: Goal Outcome Summary  Goal: Goal Outcome Summary  Outcome: Adequate for Discharge Date Met:  02/20/17  Feels well.  Vitals stable.  Breast feeding going well.  Oral pain medications working.  Ready for discharge home with baby.

## 2017-02-20 NOTE — ANESTHESIA POSTPROCEDURE EVALUATION
Patient: Sultana Oliveros    * No procedures listed *    Diagnosis:* No pre-op diagnosis entered *  Diagnosis Additional Information: No value filed.    Anesthesia Type:  No value filed.    Note:  Anesthesia Post Evaluation    Patient location during evaluation: Bedside  Patient participation: Able to fully participate in evaluation  Level of consciousness: awake  Pain management: adequate  Airway patency: patent  Cardiovascular status: acceptable  Respiratory status: acceptable  Hydration status: acceptable  PONV: none     Anesthetic complications: None    Comments: Pt doing well.  Denies epidural-related complaints.        Last vitals:  Vitals:    02/18/17 2350 02/19/17 0847 02/19/17 1629   BP: 112/67 141/86 148/88   Pulse:   80   Resp: 16 16 16   Temp: 36.9  C (98.4  F) 36.6  C (97.9  F) 36.7  C (98.1  F)   SpO2:            Electronically Signed By: Phill Fay MD  February 19, 2017  7:00 PM